# Patient Record
Sex: FEMALE | Race: WHITE | NOT HISPANIC OR LATINO | Employment: OTHER | URBAN - METROPOLITAN AREA
[De-identification: names, ages, dates, MRNs, and addresses within clinical notes are randomized per-mention and may not be internally consistent; named-entity substitution may affect disease eponyms.]

---

## 2023-07-17 DIAGNOSIS — I10 HYPERTENSION, UNSPECIFIED TYPE: Primary | ICD-10-CM

## 2023-07-17 RX ORDER — LISINOPRIL AND HYDROCHLOROTHIAZIDE 20; 25 MG/1; MG/1
1 TABLET ORAL DAILY
COMMUNITY
Start: 2015-11-20 | End: 2023-07-17 | Stop reason: SDUPTHER

## 2023-07-17 NOTE — TELEPHONE ENCOUNTER
Rx Refill Request Telephone Encounter    Name:  Anusha Dinero  :  [id number]  Medication Name:  lisinopril-hydrochlorothiazide 20-25 mg tablet  Specific Pharmacy location:    [facility] - [address]  [address] [facility]  [address]  [facility] [address]  Phone: [phone] Fax: [phone]  Date of last appointment:  2022  Date of next appointment:  2023  Best number to reach patient:  [phone]

## 2023-07-18 ENCOUNTER — OFFICE VISIT (OUTPATIENT)
Dept: PRIMARY CARE | Facility: CLINIC | Age: 68
End: 2023-07-18
Payer: MEDICARE

## 2023-07-18 VITALS
RESPIRATION RATE: 17 BRPM | BODY MASS INDEX: 25.93 KG/M2 | TEMPERATURE: 98.2 F | SYSTOLIC BLOOD PRESSURE: 136 MMHG | HEART RATE: 78 BPM | DIASTOLIC BLOOD PRESSURE: 88 MMHG | OXYGEN SATURATION: 98 % | WEIGHT: 146.4 LBS

## 2023-07-18 DIAGNOSIS — M79.671 RIGHT FOOT PAIN: Primary | ICD-10-CM

## 2023-07-18 DIAGNOSIS — E79.0 HYPERURICEMIA: ICD-10-CM

## 2023-07-18 DIAGNOSIS — N18.32 CHRONIC RENAL IMPAIRMENT, STAGE 3B (MULTI): ICD-10-CM

## 2023-07-18 PROCEDURE — 99214 OFFICE O/P EST MOD 30 MIN: CPT | Performed by: FAMILY MEDICINE

## 2023-07-18 PROCEDURE — 1125F AMNT PAIN NOTED PAIN PRSNT: CPT | Performed by: FAMILY MEDICINE

## 2023-07-18 PROCEDURE — 1036F TOBACCO NON-USER: CPT | Performed by: FAMILY MEDICINE

## 2023-07-18 PROCEDURE — 1160F RVW MEDS BY RX/DR IN RCRD: CPT | Performed by: FAMILY MEDICINE

## 2023-07-18 PROCEDURE — 1159F MED LIST DOCD IN RCRD: CPT | Performed by: FAMILY MEDICINE

## 2023-07-18 RX ORDER — PNV NO.95/FERROUS FUM/FOLIC AC 28MG-0.8MG
1 TABLET ORAL DAILY
COMMUNITY

## 2023-07-18 RX ORDER — PREDNISONE 20 MG/1
40 TABLET ORAL DAILY
Qty: 10 TABLET | Refills: 0 | Status: SHIPPED | OUTPATIENT
Start: 2023-07-18 | End: 2023-07-23

## 2023-07-18 RX ORDER — CHOLECALCIFEROL (VITAMIN D3) 25 MCG
1 TABLET,CHEWABLE ORAL DAILY
COMMUNITY
Start: 2015-10-22

## 2023-07-18 ASSESSMENT — PAIN SCALES - GENERAL: PAINLEVEL: 5

## 2023-07-18 NOTE — PROGRESS NOTES
"Subjective   Patient ID: Anusha Dinero is a 68 y.o. female who presents for ER Follow-up ([facility] 7/15. R foot big toe. Was told gouty arthritis. Given cephalexin 500 mg 4x/day for 5 days. Refused steroids when offered in the ER. Reports swelling and pain started up yesterday again. Edema and redness mainly on joint of R big toe. Is using tylenol to help with pain, reports minimal relief. Was told not to use advil in ER. Reports no weeping/drainage. Warm to touch. ).    HPI : Follow up from ED  On day # 3 of cephalexin for possible cellulitis.  ED's differential diagnosis was gout, patient did not want to take prednisone  \"worried side effects from taking prednisone\"    red forefoot edema, swelling and redness   Associated with  dull aching pain at ball of right great toe with weight bearing    Review of Systems   All other systems reviewed and are negative.      Objective   /88 (BP Location: Left arm, Patient Position: Sitting, BP Cuff Size: Adult)   Pulse 78   Temp 36.8 °C (98.2 °F) (Temporal)   Resp 17   Wt 66.4 kg (146 lb 6.4 oz)   SpO2 98%   BMI 25.93 kg/m²     Physical Exam  Vitals and nursing note reviewed.   Constitutional:       Appearance: Normal appearance.   Cardiovascular:      Rate and Rhythm: Normal rate and regular rhythm.   Pulmonary:      Effort: Pulmonary effort is normal.      Breath sounds: Normal breath sounds.   Feet:      Comments: swelling localized to right fore foot ( toes and forefoot)  With redness, no increased warmth, skin intact.    Neurological:      Mental Status: She is alert.         Assessment/Plan   Problem List Items Addressed This Visit    None  Visit Diagnoses       Right foot pain    -  Primary    differential diagnosis: localized angioedema, gout and cellulitis in decreasing order. complete ceohalexin course.    Relevant Medications    predniSONE (Deltasone) 20 mg tablet    Chronic renal impairment, stage 3b        Recommended urine albumin and renal " ultrasound:part of w/u    Hyperuricemia        start allupurinol at follow up appointment

## 2023-07-19 ENCOUNTER — APPOINTMENT (OUTPATIENT)
Dept: PRIMARY CARE | Facility: CLINIC | Age: 68
End: 2023-07-19
Payer: MEDICARE

## 2023-07-19 RX ORDER — LISINOPRIL AND HYDROCHLOROTHIAZIDE 20; 25 MG/1; MG/1
1 TABLET ORAL DAILY
Qty: 90 TABLET | Refills: 0 | Status: SHIPPED | OUTPATIENT
Start: 2023-07-19 | End: 2023-11-06 | Stop reason: SDUPTHER

## 2023-07-31 ENCOUNTER — OFFICE VISIT (OUTPATIENT)
Dept: PRIMARY CARE | Facility: CLINIC | Age: 68
End: 2023-07-31
Payer: MEDICARE

## 2023-07-31 VITALS
HEART RATE: 78 BPM | BODY MASS INDEX: 26.23 KG/M2 | TEMPERATURE: 98 F | WEIGHT: 148.1 LBS | OXYGEN SATURATION: 98 % | DIASTOLIC BLOOD PRESSURE: 66 MMHG | SYSTOLIC BLOOD PRESSURE: 162 MMHG | RESPIRATION RATE: 17 BRPM

## 2023-07-31 DIAGNOSIS — M10.9 GOUT INVOLVING TOE OF RIGHT FOOT, UNSPECIFIED CAUSE, UNSPECIFIED CHRONICITY: Primary | ICD-10-CM

## 2023-07-31 PROCEDURE — 99214 OFFICE O/P EST MOD 30 MIN: CPT | Performed by: NURSE PRACTITIONER

## 2023-07-31 PROCEDURE — 1159F MED LIST DOCD IN RCRD: CPT | Performed by: NURSE PRACTITIONER

## 2023-07-31 PROCEDURE — 1160F RVW MEDS BY RX/DR IN RCRD: CPT | Performed by: NURSE PRACTITIONER

## 2023-07-31 PROCEDURE — 1125F AMNT PAIN NOTED PAIN PRSNT: CPT | Performed by: NURSE PRACTITIONER

## 2023-07-31 PROCEDURE — 1036F TOBACCO NON-USER: CPT | Performed by: NURSE PRACTITIONER

## 2023-07-31 ASSESSMENT — PAIN SCALES - GENERAL: PAINLEVEL: 3

## 2023-07-31 ASSESSMENT — ENCOUNTER SYMPTOMS
ROS SKIN COMMENTS: SEE HPI
FEVER: 0

## 2023-07-31 NOTE — PROGRESS NOTES
Subjective   Patient ID: Anusha Dinero is a 68 y.o. female who presents for Follow-up (Was seen previously by  on 7/18 for ER f/up. Reports having an exacerbation about 4 days ago. Cannot wear regular shoes. Bilateral legs/feet swelling x2 weeks. Reports no drainage. Warm to touch. ).    HPI     Presents to clinic for follow-up visit.  Patient diagnosed with gout and admits that symptoms are improving.  She states she completed her dose of prednisone . She states she has still been unable to wear enclosed shoes.  Blood pressure elevated today in clinic-  Patient has been taking Advil as needed for the pain.        Appetite normal bowel and bladder normal.        Review of Systems   Constitutional:  Negative for fever.   Skin:         See HPI   All other systems reviewed and are negative.      Objective   /66 (BP Location: Left arm, Patient Position: Sitting, BP Cuff Size: Adult)   Pulse 78   Temp 36.7 °C (98 °F) (Temporal)   Resp 17   Wt 67.2 kg (148 lb 1.6 oz)   SpO2 98%   BMI 26.23 kg/m²     Physical Exam  Vitals reviewed.   Constitutional:       Appearance: Normal appearance. She is not ill-appearing or toxic-appearing.   Cardiovascular:      Rate and Rhythm: Normal rate and regular rhythm.   Pulmonary:      Effort: Pulmonary effort is normal.      Breath sounds: Normal breath sounds.   Feet:      Comments: Right foot without signs of gout.  No erythema no warmth not painful to touch.  Neurovascular intact.  Skin:     General: Skin is warm and dry.   Neurological:      Mental Status: She is alert.         Assessment/Plan   Problem List Items Addressed This Visit    None  Visit Diagnoses       Gout involving toe of right foot, unspecified cause, unspecified chronicity    -  Primary         Discussed acute and chronic medication use for gout.  Patient chooses not to go on medication at this time.  Patient advised to be very careful with taking NSAIDs as this medication has been elevating blood  pressure.    Patient advised to check blood pressures at home and report elevated readings to office.

## 2023-07-31 NOTE — PATIENT INSTRUCTIONS
Advil is likely increasing your blood pressure.  Follow a low purine diet.  Drink plenty of water.  Follow-up 4 months sooner if needed.

## 2023-08-09 ENCOUNTER — APPOINTMENT (OUTPATIENT)
Dept: PRIMARY CARE | Facility: CLINIC | Age: 68
End: 2023-08-09
Payer: MEDICARE

## 2023-11-06 ENCOUNTER — OFFICE VISIT (OUTPATIENT)
Dept: PRIMARY CARE | Facility: CLINIC | Age: 68
End: 2023-11-06
Payer: MEDICARE

## 2023-11-06 VITALS
BODY MASS INDEX: 25.11 KG/M2 | RESPIRATION RATE: 18 BRPM | WEIGHT: 141.7 LBS | HEIGHT: 63 IN | SYSTOLIC BLOOD PRESSURE: 136 MMHG | HEART RATE: 73 BPM | OXYGEN SATURATION: 100 % | TEMPERATURE: 97.2 F | DIASTOLIC BLOOD PRESSURE: 68 MMHG

## 2023-11-06 DIAGNOSIS — Z00.00 MEDICARE ANNUAL WELLNESS VISIT, SUBSEQUENT: Primary | ICD-10-CM

## 2023-11-06 DIAGNOSIS — I10 HYPERTENSION, UNSPECIFIED TYPE: ICD-10-CM

## 2023-11-06 DIAGNOSIS — Z12.31 ENCOUNTER FOR SCREENING MAMMOGRAM FOR MALIGNANT NEOPLASM OF BREAST: ICD-10-CM

## 2023-11-06 PROCEDURE — 1036F TOBACCO NON-USER: CPT | Performed by: NURSE PRACTITIONER

## 2023-11-06 PROCEDURE — 1159F MED LIST DOCD IN RCRD: CPT | Performed by: NURSE PRACTITIONER

## 2023-11-06 PROCEDURE — 1170F FXNL STATUS ASSESSED: CPT | Performed by: NURSE PRACTITIONER

## 2023-11-06 PROCEDURE — 1126F AMNT PAIN NOTED NONE PRSNT: CPT | Performed by: NURSE PRACTITIONER

## 2023-11-06 PROCEDURE — G0008 ADMIN INFLUENZA VIRUS VAC: HCPCS | Performed by: NURSE PRACTITIONER

## 2023-11-06 PROCEDURE — G0439 PPPS, SUBSEQ VISIT: HCPCS | Performed by: NURSE PRACTITIONER

## 2023-11-06 PROCEDURE — 3075F SYST BP GE 130 - 139MM HG: CPT | Performed by: NURSE PRACTITIONER

## 2023-11-06 PROCEDURE — 1160F RVW MEDS BY RX/DR IN RCRD: CPT | Performed by: NURSE PRACTITIONER

## 2023-11-06 PROCEDURE — 90662 IIV NO PRSV INCREASED AG IM: CPT | Performed by: NURSE PRACTITIONER

## 2023-11-06 PROCEDURE — 3078F DIAST BP <80 MM HG: CPT | Performed by: NURSE PRACTITIONER

## 2023-11-06 RX ORDER — LISINOPRIL AND HYDROCHLOROTHIAZIDE 20; 25 MG/1; MG/1
1 TABLET ORAL DAILY
Qty: 90 TABLET | Refills: 2 | Status: SHIPPED | OUTPATIENT
Start: 2023-11-06 | End: 2024-08-02

## 2023-11-06 RX ORDER — BIOTIN 5 MG
1 TABLET ORAL DAILY
COMMUNITY

## 2023-11-06 ASSESSMENT — ACTIVITIES OF DAILY LIVING (ADL)
DRESSING: INDEPENDENT
GROCERY_SHOPPING: INDEPENDENT
TAKING_MEDICATION: INDEPENDENT
DOING_HOUSEWORK: INDEPENDENT
MANAGING_FINANCES: INDEPENDENT
BATHING: INDEPENDENT

## 2023-11-06 ASSESSMENT — PATIENT HEALTH QUESTIONNAIRE - PHQ9
2. FEELING DOWN, DEPRESSED OR HOPELESS: NOT AT ALL
SUM OF ALL RESPONSES TO PHQ9 QUESTIONS 1 AND 2: 0
1. LITTLE INTEREST OR PLEASURE IN DOING THINGS: NOT AT ALL

## 2023-11-06 ASSESSMENT — PAIN SCALES - GENERAL: PAINLEVEL: 0-NO PAIN

## 2023-11-06 NOTE — PATIENT INSTRUCTIONS
Continue medications as prescribed.  Healthy diet and drink plenty of water.  Please have labs drawn-You will be notified with abnormal results only.    Please schedule all necessary health screenings ophthalmology and dentist.    Follow-up in 6 months sooner if needed.  Call office any new concerns.

## 2023-11-06 NOTE — PROGRESS NOTES
"Subjective   Patient ID: Anusha Dinero is a 68 y.o. female who presents for Medicare wellness.    HPI     Patient presents to clinic accompanied by  for Medicare wellness visit.    Patient states she is doing okay no specific complaints or concerns.    Diet and exercises  Eats a healthy diet-we discussed eating several servings of fruits and vegetables daily whole foods increasing fiber and limiting sugar and high sodium in the diet.      Patient is active she cares for her mother exercise by walking.  Sleeps well at night around 7-1/2 hours per night.    Preventive screenings:  Mammogram ordered today in clinic  Colonoscopy 2018 Cologuard 2022 -negative per patient   Ophthalmology and dental visits up-to-date  Bone density-up-to-date per patient  Shingles vaccine-discussed CDC guidelines declines today in clinic  Influenza vaccine-updated today in clinic  Covid vaccine-discussed CDC guidelines will receive booster at local pharmacy or drugstore.  Pneumonia vaccine-discussed CDC guidelines declines today.  Tdap-not up-to-date declines today    Review of Systems    Objective   /68 (BP Location: Left arm, Patient Position: Sitting, BP Cuff Size: Adult)   Pulse 73   Temp 36.2 °C (97.2 °F) (Temporal)   Resp 18   Ht 1.6 m (5' 3\")   Wt 64.3 kg (141 lb 11.2 oz)   SpO2 100%   BMI 25.10 kg/m²     Physical Exam    Assessment/Plan   Problem List Items Addressed This Visit    None  Visit Diagnoses       Medicare annual wellness visit, subsequent    -  Primary    Relevant Orders    BI mammo bilateral screening tomosynthesis    CBC    Comprehensive Metabolic Panel    Lipid Panel    Vitamin D 25-Hydroxy,Total (for eval of Vitamin D levels)    Tsh With Reflex To Free T4 If Abnormal    Flu vaccine, quadrivalent, high-dose, preservative free, age 65y+ (FLUZONE) (Completed)    Referral to Obstetrics / Gynecology    Hypertension, unspecified type      Blood pressure controlled 136/68.    Relevant Medications    " lisinopriL-hydrochlorothiazide 20-25 mg tablet    Other Relevant Orders    CBC    Encounter for screening mammogram for malignant neoplasm of breast        Relevant Orders    BI mammo bilateral screening tomosynthesis

## 2023-11-16 ENCOUNTER — LAB (OUTPATIENT)
Dept: LAB | Facility: LAB | Age: 68
End: 2023-11-16
Payer: MEDICARE

## 2023-11-16 DIAGNOSIS — I10 HYPERTENSION, UNSPECIFIED TYPE: ICD-10-CM

## 2023-11-16 DIAGNOSIS — D64.9 ANEMIA, UNSPECIFIED TYPE: ICD-10-CM

## 2023-11-16 DIAGNOSIS — Z00.00 MEDICARE ANNUAL WELLNESS VISIT, SUBSEQUENT: ICD-10-CM

## 2023-11-16 LAB
25(OH)D3 SERPL-MCNC: 52 NG/ML (ref 30–100)
ALBUMIN SERPL BCP-MCNC: 4.2 G/DL (ref 3.4–5)
ALP SERPL-CCNC: 79 U/L (ref 33–136)
ALT SERPL W P-5'-P-CCNC: 30 U/L (ref 7–45)
ANION GAP SERPL CALC-SCNC: 11 MMOL/L (ref 10–20)
AST SERPL W P-5'-P-CCNC: 27 U/L (ref 9–39)
BILIRUB SERPL-MCNC: 0.7 MG/DL (ref 0–1.2)
BUN SERPL-MCNC: 29 MG/DL (ref 6–23)
CALCIUM SERPL-MCNC: 9.3 MG/DL (ref 8.6–10.3)
CHLORIDE SERPL-SCNC: 104 MMOL/L (ref 98–107)
CHOLEST SERPL-MCNC: 186 MG/DL (ref 0–199)
CHOLESTEROL/HDL RATIO: 2.7
CO2 SERPL-SCNC: 29 MMOL/L (ref 21–32)
CREAT SERPL-MCNC: 1.27 MG/DL (ref 0.5–1.05)
ERYTHROCYTE [DISTWIDTH] IN BLOOD BY AUTOMATED COUNT: 12.7 % (ref 11.5–14.5)
GFR SERPL CREATININE-BSD FRML MDRD: 46 ML/MIN/1.73M*2
GLUCOSE SERPL-MCNC: 86 MG/DL (ref 74–99)
HCT VFR BLD AUTO: 32.7 % (ref 36–46)
HDLC SERPL-MCNC: 69.4 MG/DL
HGB BLD-MCNC: 10.7 G/DL (ref 12–16)
LDLC SERPL CALC-MCNC: 91 MG/DL
MCH RBC QN AUTO: 29.6 PG (ref 26–34)
MCHC RBC AUTO-ENTMCNC: 32.7 G/DL (ref 32–36)
MCV RBC AUTO: 90 FL (ref 80–100)
NON HDL CHOLESTEROL: 117 MG/DL (ref 0–149)
NRBC BLD-RTO: 0 /100 WBCS (ref 0–0)
PLATELET # BLD AUTO: 254 X10*3/UL (ref 150–450)
POTASSIUM SERPL-SCNC: 4 MMOL/L (ref 3.5–5.3)
PROT SERPL-MCNC: 6.8 G/DL (ref 6.4–8.2)
RBC # BLD AUTO: 3.62 X10*6/UL (ref 4–5.2)
SODIUM SERPL-SCNC: 140 MMOL/L (ref 136–145)
TRIGL SERPL-MCNC: 129 MG/DL (ref 0–149)
TSH SERPL-ACNC: 2.31 MIU/L (ref 0.44–3.98)
VLDL: 26 MG/DL (ref 0–40)
WBC # BLD AUTO: 6.8 X10*3/UL (ref 4.4–11.3)

## 2023-11-16 PROCEDURE — 80053 COMPREHEN METABOLIC PANEL: CPT

## 2023-11-16 PROCEDURE — 82728 ASSAY OF FERRITIN: CPT

## 2023-11-16 PROCEDURE — 36415 COLL VENOUS BLD VENIPUNCTURE: CPT

## 2023-11-16 PROCEDURE — 84443 ASSAY THYROID STIM HORMONE: CPT

## 2023-11-16 PROCEDURE — 80061 LIPID PANEL: CPT

## 2023-11-16 PROCEDURE — 83550 IRON BINDING TEST: CPT

## 2023-11-16 PROCEDURE — 82306 VITAMIN D 25 HYDROXY: CPT

## 2023-11-16 PROCEDURE — 85027 COMPLETE CBC AUTOMATED: CPT

## 2023-11-16 PROCEDURE — 83540 ASSAY OF IRON: CPT

## 2023-11-17 DIAGNOSIS — D64.9 ANEMIA, UNSPECIFIED TYPE: Primary | ICD-10-CM

## 2023-11-17 LAB
FERRITIN SERPL-MCNC: 308 NG/ML (ref 8–150)
IRON SATN MFR SERPL: 26 % (ref 25–45)
IRON SERPL-MCNC: 76 UG/DL (ref 35–150)
TIBC SERPL-MCNC: 293 UG/DL (ref 240–445)
UIBC SERPL-MCNC: 217 UG/DL (ref 110–370)

## 2023-12-01 ENCOUNTER — APPOINTMENT (OUTPATIENT)
Dept: RADIOLOGY | Facility: CLINIC | Age: 68
End: 2023-12-01
Payer: MEDICARE

## 2023-12-14 ENCOUNTER — ANCILLARY PROCEDURE (OUTPATIENT)
Dept: RADIOLOGY | Facility: CLINIC | Age: 68
End: 2023-12-14
Payer: MEDICARE

## 2023-12-14 DIAGNOSIS — N64.89 BREAST ASYMMETRY: Primary | ICD-10-CM

## 2023-12-14 DIAGNOSIS — Z00.00 MEDICARE ANNUAL WELLNESS VISIT, SUBSEQUENT: ICD-10-CM

## 2023-12-14 DIAGNOSIS — Z12.31 ENCOUNTER FOR SCREENING MAMMOGRAM FOR MALIGNANT NEOPLASM OF BREAST: ICD-10-CM

## 2023-12-14 PROCEDURE — 77067 SCR MAMMO BI INCL CAD: CPT | Performed by: RADIOLOGY

## 2023-12-14 PROCEDURE — 77063 BREAST TOMOSYNTHESIS BI: CPT | Performed by: RADIOLOGY

## 2023-12-14 PROCEDURE — 77067 SCR MAMMO BI INCL CAD: CPT

## 2023-12-14 NOTE — PROGRESS NOTES
Phoned patient with results of mammogram shows left breast asymmetry recommends further assessment with ultrasound.  Left breast ultrasound ordered.

## 2023-12-15 ENCOUNTER — TELEPHONE (OUTPATIENT)
Dept: PRIMARY CARE | Facility: CLINIC | Age: 68
End: 2023-12-15
Payer: MEDICARE

## 2023-12-15 NOTE — TELEPHONE ENCOUNTER
----- Message from ANTONIO Gayle sent at 12/15/2023 12:53 PM EST -----  Regarding: Marylu  Phoned patient left message to call our office-have not been able to reach patient.  Mammogram shows asymmetry and ultrasound is requested of the left breast.  Ultrasound has already been ordered.  Please call patient and leave another message.  ----- Message -----  From: Interface, Radiology Results In  Sent: 12/14/2023  12:18 PM EST  To: ANTONIO Gayle

## 2023-12-15 NOTE — TELEPHONE ENCOUNTER
Patient returned nurses phone call and notified patient of mammogram results and CNP recommendations. Patient verbalized understanding.

## 2023-12-15 NOTE — TELEPHONE ENCOUNTER
Called patient, unable to reach, [facility] to relay results. Will also send MyChart message.

## 2024-01-04 ENCOUNTER — ANCILLARY PROCEDURE (OUTPATIENT)
Dept: RADIOLOGY | Facility: CLINIC | Age: 69
End: 2024-01-04
Payer: MEDICARE

## 2024-01-04 DIAGNOSIS — N64.89 BREAST ASYMMETRY: ICD-10-CM

## 2024-01-04 DIAGNOSIS — R92.8 OTHER ABNORMAL AND INCONCLUSIVE FINDINGS ON DIAGNOSTIC IMAGING OF BREAST: ICD-10-CM

## 2024-01-04 PROCEDURE — 77061 BREAST TOMOSYNTHESIS UNI: CPT | Mod: LT

## 2024-01-04 PROCEDURE — 76642 ULTRASOUND BREAST LIMITED: CPT | Mod: LT

## 2024-01-04 PROCEDURE — G0279 TOMOSYNTHESIS, MAMMO: HCPCS | Mod: LEFT SIDE | Performed by: RADIOLOGY

## 2024-01-04 PROCEDURE — 76642 ULTRASOUND BREAST LIMITED: CPT | Mod: LEFT SIDE | Performed by: RADIOLOGY

## 2024-01-04 PROCEDURE — 77065 DX MAMMO INCL CAD UNI: CPT | Mod: LEFT SIDE | Performed by: RADIOLOGY

## 2024-01-09 ENCOUNTER — TELEPHONE (OUTPATIENT)
Dept: PRIMARY CARE | Facility: CLINIC | Age: 69
End: 2024-01-09
Payer: MEDICARE

## 2024-01-09 NOTE — TELEPHONE ENCOUNTER
Called and notified patient of mammogram results and CNP recommendations. Patient verbalized understanding.

## 2024-01-09 NOTE — TELEPHONE ENCOUNTER
----- Message from ANTONIO Gayle sent at 1/8/2024  4:04 PM EST -----  Regarding: US  Per radiology- left breast ultrasound probably benign recommend repeat follow-up in 6 months.  ----- Message -----  From: Interface, Radiology Results In  Sent: 1/4/2024   9:23 AM EST  To: ANTONIO Gayle

## 2024-05-06 ENCOUNTER — OFFICE VISIT (OUTPATIENT)
Dept: PRIMARY CARE | Facility: CLINIC | Age: 69
End: 2024-05-06
Payer: MEDICARE

## 2024-05-06 VITALS
TEMPERATURE: 95 F | SYSTOLIC BLOOD PRESSURE: 171 MMHG | BODY MASS INDEX: 24.41 KG/M2 | DIASTOLIC BLOOD PRESSURE: 69 MMHG | HEART RATE: 72 BPM | OXYGEN SATURATION: 98 % | RESPIRATION RATE: 18 BRPM | WEIGHT: 137.8 LBS

## 2024-05-06 DIAGNOSIS — Z71.84 TRAVEL ADVICE ENCOUNTER: ICD-10-CM

## 2024-05-06 DIAGNOSIS — I10 PRIMARY HYPERTENSION: ICD-10-CM

## 2024-05-06 DIAGNOSIS — M10.071 ACUTE IDIOPATHIC GOUT OF RIGHT FOOT: Primary | ICD-10-CM

## 2024-05-06 DIAGNOSIS — I77.1 SUBCLAVIAN ARTERIAL STENOSIS (CMS-HCC): ICD-10-CM

## 2024-05-06 DIAGNOSIS — N18.32 CHRONIC RENAL IMPAIRMENT, STAGE 3B (MULTI): ICD-10-CM

## 2024-05-06 PROCEDURE — 3078F DIAST BP <80 MM HG: CPT | Performed by: FAMILY MEDICINE

## 2024-05-06 PROCEDURE — 99214 OFFICE O/P EST MOD 30 MIN: CPT | Performed by: FAMILY MEDICINE

## 2024-05-06 PROCEDURE — 1159F MED LIST DOCD IN RCRD: CPT | Performed by: FAMILY MEDICINE

## 2024-05-06 PROCEDURE — 1036F TOBACCO NON-USER: CPT | Performed by: FAMILY MEDICINE

## 2024-05-06 PROCEDURE — 3077F SYST BP >= 140 MM HG: CPT | Performed by: FAMILY MEDICINE

## 2024-05-06 PROCEDURE — 1160F RVW MEDS BY RX/DR IN RCRD: CPT | Performed by: FAMILY MEDICINE

## 2024-05-06 PROCEDURE — 1126F AMNT PAIN NOTED NONE PRSNT: CPT | Performed by: FAMILY MEDICINE

## 2024-05-06 RX ORDER — COLCHICINE 0.6 MG/1
TABLET ORAL
Qty: 30 TABLET | Refills: 5 | Status: SHIPPED | OUTPATIENT
Start: 2024-05-06

## 2024-05-06 ASSESSMENT — PATIENT HEALTH QUESTIONNAIRE - PHQ9
1. LITTLE INTEREST OR PLEASURE IN DOING THINGS: NOT AT ALL
2. FEELING DOWN, DEPRESSED OR HOPELESS: NOT AT ALL
SUM OF ALL RESPONSES TO PHQ9 QUESTIONS 1 AND 2: 0

## 2024-05-06 ASSESSMENT — PAIN SCALES - GENERAL: PAINLEVEL: 0-NO PAIN

## 2024-05-06 NOTE — PROGRESS NOTES
Subjective   Patient ID: Anusha Dinero is a 69 y.o. female who presents for Follow-up (6 Month).    HPI : Going to [place] for 3 weeks trip with family.    Would like to take medication for gout flare up, if needed.          Review of Systems   All other systems reviewed and are negative.      Objective   /69 (BP Location: Left arm, Patient Position: Sitting, BP Cuff Size: Adult)   Pulse 72   Temp 35 °C (95 °F) (Temporal)   Resp 18   Wt 62.5 kg (137 lb 12.8 oz)   SpO2 98%   BMI 24.41 kg/m²     Physical Exam  Vitals and nursing note reviewed.   Cardiovascular:      Rate and Rhythm: Normal rate and regular rhythm.   Pulmonary:      Effort: Pulmonary effort is normal.      Breath sounds: Normal breath sounds.   Neurological:      Mental Status: She is alert.   Psychiatric:         Mood and Affect: Mood normal.         Behavior: Behavior normal.         Thought Content: Thought content normal.         Judgment: Judgment normal.         Assessment/Plan   Diagnoses and all orders for this visit:  Acute idiopathic gout of right foot  -     colchicine 0.6 mg tablet; Day 1:Take 1.2 mg at the onset, 0.6 mg an hour later. Day 2, take 0.6 mg bid until flare up resolves  Primary hypertension  -     CT cardiac scoring wo IV contrast; Future  Chronic renal impairment, stage 3b (Multi)  -     CT cardiac scoring wo IV contrast; Future  Subclavian arterial stenosis (CMS-HCC)  Comments:  diagnosed in her 20s, is stable.  Travel advice encounter  Other orders  -     Follow Up In Primary Care - Established; Future

## 2024-07-02 ENCOUNTER — HOSPITAL ENCOUNTER (OUTPATIENT)
Dept: RADIOLOGY | Facility: HOSPITAL | Age: 69
Discharge: HOME | End: 2024-07-02
Payer: MEDICARE

## 2024-07-02 DIAGNOSIS — I10 PRIMARY HYPERTENSION: ICD-10-CM

## 2024-07-02 DIAGNOSIS — N18.32 CHRONIC RENAL IMPAIRMENT, STAGE 3B (MULTI): ICD-10-CM

## 2024-07-02 PROCEDURE — 75571 CT HRT W/O DYE W/CA TEST: CPT

## 2024-07-11 DIAGNOSIS — I10 HYPERTENSION, UNSPECIFIED TYPE: ICD-10-CM

## 2024-07-17 RX ORDER — LISINOPRIL AND HYDROCHLOROTHIAZIDE 20; 25 MG/1; MG/1
1 TABLET ORAL DAILY
Qty: 90 TABLET | Refills: 2 | Status: SHIPPED | OUTPATIENT
Start: 2024-07-17 | End: 2025-07-17

## 2024-11-06 ENCOUNTER — APPOINTMENT (OUTPATIENT)
Dept: PRIMARY CARE | Facility: CLINIC | Age: 69
End: 2024-11-06
Payer: MEDICARE

## 2024-11-06 VITALS
BODY MASS INDEX: 23.75 KG/M2 | RESPIRATION RATE: 18 BRPM | OXYGEN SATURATION: 98 % | DIASTOLIC BLOOD PRESSURE: 69 MMHG | TEMPERATURE: 97.3 F | HEART RATE: 77 BPM | SYSTOLIC BLOOD PRESSURE: 130 MMHG | WEIGHT: 134.1 LBS

## 2024-11-06 DIAGNOSIS — I10 HYPERTENSION, BENIGN: Primary | ICD-10-CM

## 2024-11-06 DIAGNOSIS — R09.89 BILATERAL CAROTID BRUITS: ICD-10-CM

## 2024-11-06 DIAGNOSIS — N18.32 CHRONIC RENAL IMPAIRMENT, STAGE 3B (MULTI): ICD-10-CM

## 2024-11-06 DIAGNOSIS — Z13.820 ENCOUNTER FOR OSTEOPOROSIS SCREENING IN ASYMPTOMATIC POSTMENOPAUSAL PATIENT: ICD-10-CM

## 2024-11-06 DIAGNOSIS — Z12.31 ENCOUNTER FOR SCREENING MAMMOGRAM FOR BREAST CANCER: ICD-10-CM

## 2024-11-06 DIAGNOSIS — Z00.00 MEDICARE ANNUAL WELLNESS VISIT, SUBSEQUENT: ICD-10-CM

## 2024-11-06 DIAGNOSIS — I77.1 SUBCLAVIAN ARTERIAL STENOSIS: ICD-10-CM

## 2024-11-06 DIAGNOSIS — Z78.0 ENCOUNTER FOR OSTEOPOROSIS SCREENING IN ASYMPTOMATIC POSTMENOPAUSAL PATIENT: ICD-10-CM

## 2024-11-06 DIAGNOSIS — I71.21 ANEURYSM OF ASCENDING AORTA WITHOUT RUPTURE (CMS-HCC): ICD-10-CM

## 2024-11-06 PROCEDURE — 1123F ACP DISCUSS/DSCN MKR DOCD: CPT | Performed by: FAMILY MEDICINE

## 2024-11-06 PROCEDURE — 1158F ADVNC CARE PLAN TLK DOCD: CPT | Performed by: FAMILY MEDICINE

## 2024-11-06 PROCEDURE — 3075F SYST BP GE 130 - 139MM HG: CPT | Performed by: FAMILY MEDICINE

## 2024-11-06 PROCEDURE — 1160F RVW MEDS BY RX/DR IN RCRD: CPT | Performed by: FAMILY MEDICINE

## 2024-11-06 PROCEDURE — 1100F PTFALLS ASSESS-DOCD GE2>/YR: CPT | Performed by: FAMILY MEDICINE

## 2024-11-06 PROCEDURE — 1036F TOBACCO NON-USER: CPT | Performed by: FAMILY MEDICINE

## 2024-11-06 PROCEDURE — 1170F FXNL STATUS ASSESSED: CPT | Performed by: FAMILY MEDICINE

## 2024-11-06 PROCEDURE — G0439 PPPS, SUBSEQ VISIT: HCPCS | Performed by: FAMILY MEDICINE

## 2024-11-06 PROCEDURE — 1159F MED LIST DOCD IN RCRD: CPT | Performed by: FAMILY MEDICINE

## 2024-11-06 PROCEDURE — 1126F AMNT PAIN NOTED NONE PRSNT: CPT | Performed by: FAMILY MEDICINE

## 2024-11-06 PROCEDURE — 3078F DIAST BP <80 MM HG: CPT | Performed by: FAMILY MEDICINE

## 2024-11-06 ASSESSMENT — ACTIVITIES OF DAILY LIVING (ADL)
DOING_HOUSEWORK: INDEPENDENT
MANAGING_FINANCES: INDEPENDENT
TAKING_MEDICATION: INDEPENDENT
DRESSING: INDEPENDENT
GROCERY_SHOPPING: INDEPENDENT
BATHING: INDEPENDENT

## 2024-11-06 ASSESSMENT — PAIN SCALES - GENERAL: PAINLEVEL_OUTOF10: 0-NO PAIN

## 2024-11-06 NOTE — ASSESSMENT & PLAN NOTE
Stable  Maintain normal BP,Do not take Ibuprofen/Naprosyn for pain, take tylenol for pain.  Orders:    CBC and Auto Differential; Future

## 2024-11-06 NOTE — ASSESSMENT & PLAN NOTE
Measures 4.2 cm  Orders:    Referral to Cardiology; Future    Referral to Vascular Surgery; Future

## 2024-11-06 NOTE — ASSESSMENT & PLAN NOTE
Diagnosed in her 20s.   Asymptomatic. Her body developed revascularization ( bypass), needed no intervention

## 2024-11-06 NOTE — ASSESSMENT & PLAN NOTE
? Transmitted from aortic aneusym  Patient referred to cardiology   Orders:    Referral to Cardiology; Future    Referral to Vascular Surgery; Future

## 2024-11-06 NOTE — PROGRESS NOTES
Subjective   Reason for Visit: Anusha Dinero is an 69 y.o. female here for a Medicare Wellness visit.     Past Medical, Surgical, and Family History reviewed and updated in chart.    Reviewed all medications by prescribing practitioner or clinical pharmacist (such as prescriptions, OTCs, herbal therapies and supplements) and documented in the medical record.    HPI    Medicare wellness  Life: Satisfactory  HRA: low risk  Grieving: mom death. Sister  in August from Sudden cardiac arrest.  Maintain good relation with immediate and extended famil  Denies Social isolation  ADL and IADL independent  Denies prolonged depression/anxiety.  Cognition and memory intact.        Patient Care Team:  Juan Alberto SLAUGHTER MD as PCP - General (Family Medicine)  Juan Alberto SLAUGHTER MD as PCP - [employer] PCP     Consultants : optometrist.    PMH and PSH reviewed  Problem list and medications: reconciled..      Review of Systems   All other systems reviewed and are negative.      Objective   Vitals:  /69 (BP Location: Left arm, Patient Position: Sitting, BP Cuff Size: Adult)   Pulse 77   Temp 36.3 °C (97.3 °F) (Temporal)   Resp 18   Wt 60.8 kg (134 lb 1.6 oz)   SpO2 98%   BMI 23.75 kg/m²       Physical Exam  Vitals and nursing note reviewed.   Cardiovascular:      Rate and Rhythm: Normal rate and regular rhythm.   Pulmonary:      Effort: Pulmonary effort is normal.      Breath sounds: Normal breath sounds.   Musculoskeletal:      Cervical back: Neck supple.   Lymphadenopathy:      Cervical: No cervical adenopathy.   Neurological:      Mental Status: She is alert.   Psychiatric:         Attention and Perception: Attention normal.         Mood and Affect: Mood normal.         Speech: Speech normal.         Behavior: Behavior normal.         Thought Content: Thought content normal.         Cognition and Memory: Cognition and memory normal.         Judgment: Judgment normal.         Assessment &  Plan  Hypertension, benign  At goal  Continue with Zestril  Subclavian arterial stenosis  Diagnosed in her 20s.   Asymptomatic. Her body developed revascularization ( bypass), needed no intervention   Chronic renal impairment, stage 3b (Multi)  Stable  Maintain normal BP,Do not take Ibuprofen/Naprosyn for pain, take tylenol for pain.  Orders:    CBC and Auto Differential; Future    Medicare annual wellness visit, subsequent  Brain health and fall prevention strategies discussed.  Orders:    Albumin-Creatinine Ratio, Urine Random; Future    Lipid panel; Future    Comprehensive Metabolic Panel; Future    CBC and Auto Differential; Future    TSH; Future    Encounter for screening mammogram for breast cancer    Orders:    BI mammo bilateral screening tomosynthesis; Future    Aneurysm of ascending aorta without rupture (CMS-HCC)  Measures 4.2 cm  Orders:    Referral to Cardiology; Future    Referral to Vascular Surgery; Future    Encounter for osteoporosis screening in asymptomatic postmenopausal patient  Vitamin D3+K2 and calcium citrate daily forbone health  Orders:    XR DEXA bone density; Future    Bilateral carotid bruits  ? Transmitted from aortic aneusym  Patient referred to cardiology   Orders:    Referral to Cardiology; Future    Referral to Vascular Surgery; Future           Alcohol Misuse Screen  5 - 10 minutes were spent screening the patient for alcohol misuse disorder.    Cardiac Risk Assessment  15 - 20 minutes were spent discussing Cardiovascular risk and, if needed, lifestyle modifications were recommended, including nutritional choices, exercise, and elimination of habits contributing to risk.   Aspirin use/disuse was discussed following the guidelines below:  low dose ASA ( mg) should be considered:    If prior Heart Attack/Stroke/Peripheral vascular disease:  Generally recommend daily low dose aspirin unless extremely high bleeding risk (e.g., gastrointestinal).    If no prior Heart  Attack/Stroke/Peripheral vascular disease:              Age over 70: Do not use Aspirin for prevention    Age less than 70 and 10-year cardiovascular disease risk is >20%: use low dose Aspirin for prevention.                 Depression Screening  5 - 10 minutes were spent screening for depression.   Counseled on normal grieving process .

## 2024-12-23 PROBLEM — D50.9 IRON DEFICIENCY ANEMIA: Status: ACTIVE | Noted: 2024-12-23

## 2024-12-23 PROBLEM — Z98.890 HISTORY OF CARDIAC CATHETERIZATION: Status: ACTIVE | Noted: 2024-12-23

## 2024-12-23 PROBLEM — E78.5 HYPERLIPIDEMIA: Status: ACTIVE | Noted: 2024-12-23

## 2024-12-23 PROBLEM — M81.0 OSTEOPOROSIS: Status: ACTIVE | Noted: 2024-12-23

## 2024-12-23 PROBLEM — E55.9 VITAMIN D DEFICIENCY: Status: ACTIVE | Noted: 2024-12-23

## 2025-01-08 ENCOUNTER — OFFICE VISIT (OUTPATIENT)
Dept: CARDIOLOGY | Facility: CLINIC | Age: 70
End: 2025-01-08
Payer: MEDICARE

## 2025-01-08 VITALS
SYSTOLIC BLOOD PRESSURE: 176 MMHG | BODY MASS INDEX: 24.38 KG/M2 | DIASTOLIC BLOOD PRESSURE: 60 MMHG | OXYGEN SATURATION: 95 % | HEIGHT: 63 IN | HEART RATE: 72 BPM | WEIGHT: 137.6 LBS

## 2025-01-08 DIAGNOSIS — R09.89 BILATERAL CAROTID BRUITS: ICD-10-CM

## 2025-01-08 DIAGNOSIS — R01.1 MURMUR, HEART: ICD-10-CM

## 2025-01-08 DIAGNOSIS — R93.1 AGATSTON CORONARY ARTERY CALCIUM SCORE LESS THAN 100: ICD-10-CM

## 2025-01-08 DIAGNOSIS — I71.21 ANEURYSM OF ASCENDING AORTA WITHOUT RUPTURE (CMS-HCC): Primary | ICD-10-CM

## 2025-01-08 DIAGNOSIS — I10 PRIMARY HYPERTENSION: ICD-10-CM

## 2025-01-08 LAB
ATRIAL RATE: 63 BPM
P AXIS: 86 DEGREES
P OFFSET: 200 MS
P ONSET: 154 MS
PR INTERVAL: 136 MS
Q ONSET: 222 MS
QRS COUNT: 10 BEATS
QRS DURATION: 76 MS
QT INTERVAL: 424 MS
QTC CALCULATION(BAZETT): 433 MS
QTC FREDERICIA: 431 MS
R AXIS: 75 DEGREES
T AXIS: 70 DEGREES
T OFFSET: 434 MS
VENTRICULAR RATE: 63 BPM

## 2025-01-08 PROCEDURE — 3078F DIAST BP <80 MM HG: CPT | Performed by: STUDENT IN AN ORGANIZED HEALTH CARE EDUCATION/TRAINING PROGRAM

## 2025-01-08 PROCEDURE — 99214 OFFICE O/P EST MOD 30 MIN: CPT | Performed by: STUDENT IN AN ORGANIZED HEALTH CARE EDUCATION/TRAINING PROGRAM

## 2025-01-08 PROCEDURE — 93010 ELECTROCARDIOGRAM REPORT: CPT | Performed by: STUDENT IN AN ORGANIZED HEALTH CARE EDUCATION/TRAINING PROGRAM

## 2025-01-08 PROCEDURE — 93005 ELECTROCARDIOGRAM TRACING: CPT | Performed by: STUDENT IN AN ORGANIZED HEALTH CARE EDUCATION/TRAINING PROGRAM

## 2025-01-08 PROCEDURE — 3008F BODY MASS INDEX DOCD: CPT | Performed by: STUDENT IN AN ORGANIZED HEALTH CARE EDUCATION/TRAINING PROGRAM

## 2025-01-08 PROCEDURE — 3077F SYST BP >= 140 MM HG: CPT | Performed by: STUDENT IN AN ORGANIZED HEALTH CARE EDUCATION/TRAINING PROGRAM

## 2025-01-08 PROCEDURE — 1159F MED LIST DOCD IN RCRD: CPT | Performed by: STUDENT IN AN ORGANIZED HEALTH CARE EDUCATION/TRAINING PROGRAM

## 2025-01-08 PROCEDURE — 99204 OFFICE O/P NEW MOD 45 MIN: CPT | Performed by: STUDENT IN AN ORGANIZED HEALTH CARE EDUCATION/TRAINING PROGRAM

## 2025-01-08 NOTE — PROGRESS NOTES
Referred by Dr. Raza for Hypertension, bruit, aneurysm, and Hyperlipidemia (Annual)     History Of Present Illness:    Anusha Dinero is a 69 y.o. female past patient notable for hypertension referred to our clinic due to aortic aneurysm appreciated on recent calcium score.  Patient recently underwent a calcium score which came back with a score of 43.91.  Additionally was found to have SMA thoracic aorta measured 4.2 cm and diffuse thoracic aortic calcified atherosclerosis.  She is asymptomatic at baseline.  She does have family history of sudden cardiac death that happened to her sister at the age of 74.  No history of aortopathy's.  She has chronic longstanding hypertension and states she typically has situational hypertension.  Most recent cholesterol shows an LDL of 91 HDL of 69.      Past Medical History:  She has a past medical history of Anemia, H/O cardiac catheterization, Hyperlipidemia, Hypertension, Living will in place, Osteoporosis, Subclavian arterial stenosis, Vitamin B12 deficiency, and Vitamin D deficiency.    Past Surgical History:  She has a past surgical history that includes Colonoscopy (05/09/2018).      Social History:  She reports that she has never smoked. She has never been exposed to tobacco smoke. She has never used smokeless tobacco. She reports current alcohol use of about 1.0 standard drink of alcohol per week. She reports that she does not use drugs.    Family History:  Family History   Problem Relation Name Age of Onset    No Known Problems Mother      Stroke Father      Other (Quadraplegic) Father      No Known Problems Sister      No Known Problems Brother      No Known Problems Daughter      No Known Problems Son      No Known Problems Mother's Sister      No Known Problems Mother's Brother      No Known Problems Father's Sister      No Known Problems Father's Brother      No Known Problems Maternal Grandmother      No Known Problems Maternal Grandfather      Cancer Paternal  "Grandmother      Pancreatic cancer Paternal Grandfather          Allergies:  Patient has no known allergies.    Outpatient Medications:  Current Outpatient Medications   Medication Instructions    biotin 5 mg tablet 1 tablet, Daily    calcium carbonate-vitamin D3 (Oscal-500) 500 mg-10 mcg (400 unit) tablet 1 tablet, Daily    colchicine 0.6 mg tablet Day 1:Take 1.2 mg at the onset, 0.6 mg an hour later. Day 2, take 0.6 mg bid until flare up resolves    cyanocobalamin, vitamin B-12, 1,000 mcg capsule 1 capsule, Daily    lisinopriL-hydrochlorothiazide 20-25 mg tablet 1 tablet, oral, Daily        Last Recorded Vitals:  Vitals:    01/08/25 0953   BP: 176/60   BP Location: Left arm   Patient Position: Sitting   BP Cuff Size: Adult   Pulse: 72   SpO2: 95%   Weight: 62.4 kg (137 lb 9.6 oz)   Height: 1.6 m (5' 3\")       Physical Exam:  General: No acute distress,  A&O x3  Skin: Warm and dry  Neck: JVD is not elevated, carotid bruits noted  ENT: Moist mucous membranes no lesions appreciated  Pulmonary: CTAB  Cards: Regular rate rhythm, 2 out of 6 crescendo decrescendo murmur leukocytic pattern, no gallops or rubs appreciated normal S1-S2  Abdomen: Soft nontender nondistended  Extremities: No edema or cyanosis  Psych: Appropriate mood and affect      Last Labs:  CBC -  Lab Results   Component Value Date    WBC 6.8 11/16/2023    HGB 10.7 (L) 11/16/2023    HCT 32.7 (L) 11/16/2023    MCV 90 11/16/2023     11/16/2023       CMP -  Lab Results   Component Value Date    CALCIUM 9.3 11/16/2023    PROT 6.8 11/16/2023    ALBUMIN 4.2 11/16/2023    AST 27 11/16/2023    ALT 30 11/16/2023    ALKPHOS 79 11/16/2023    BILITOT 0.7 11/16/2023       LIPID PANEL -   Lab Results   Component Value Date    CHOL 186 11/16/2023    TRIG 129 11/16/2023    HDL 69.4 11/16/2023    CHHDL 2.7 11/16/2023    LDLF 86 06/03/2019    VLDL 26 11/16/2023    NHDL 117 11/16/2023       RENAL FUNCTION PANEL -   Lab Results   Component Value Date    GLUCOSE 86 " "11/16/2023     11/16/2023    K 4.0 11/16/2023     11/16/2023    CO2 29 11/16/2023    ANIONGAP 11 11/16/2023    BUN 29 (H) 11/16/2023    CREATININE 1.27 (H) 11/16/2023    CALCIUM 9.3 11/16/2023    ALBUMIN 4.2 11/16/2023        No results found for: \"BNP\", \"HGBA1C\"    Assessment/Plan     1.  Aortic aneurysm: Measuring 4.2 cm by calcium score.  No previous comparisons in our system.  Presence of cardiac murmur noted on physical exam.  We have ordered echocardiogram to assess the aortic valve.  Will repeat an MRA of the chest in 6 months for surveillance purposes.    2.  Calcium score of 43.  Aortic atherosclerosis.  Diffuse.  Baseline ECG of normal sinus rhythm.  Discussed the role of statins in terms of minimizing risk of heart attack and strokes.  Patient at the present is reluctant to initiate new therapy.  Will reassess and discuss further at next appointment.    3.  Carotid bruits: Bilateral: Ordered carotid ultrasound for further characterization.    4.  Hypertension: Blood pressures are above goal.  Patient will monitor blood pressures daily and bring that data to us at next appointment.  Will adjust antihypertensive therapy as appropriate.    Follow-up in 6 months    (This note was generated with voice recognition software and may contain errors including spelling, grammar, syntax and missed recognition of what was dictated, of which may not have been fully corrected)     Ignacio Berg MD PhD  "

## 2025-01-08 NOTE — PATIENT INSTRUCTIONS
MRA of the chest in 6 months  Echocardiogram  Carotid ultrasound  Follow up in 6 months  Monitor blood pressure daily

## 2025-01-15 ENCOUNTER — HOSPITAL ENCOUNTER (OUTPATIENT)
Dept: RADIOLOGY | Facility: CLINIC | Age: 70
Discharge: HOME | End: 2025-01-15
Payer: MEDICARE

## 2025-01-15 VITALS — HEIGHT: 63 IN | WEIGHT: 137 LBS | BODY MASS INDEX: 24.27 KG/M2

## 2025-01-15 DIAGNOSIS — Z78.0 ENCOUNTER FOR OSTEOPOROSIS SCREENING IN ASYMPTOMATIC POSTMENOPAUSAL PATIENT: ICD-10-CM

## 2025-01-15 DIAGNOSIS — Z12.31 ENCOUNTER FOR SCREENING MAMMOGRAM FOR BREAST CANCER: ICD-10-CM

## 2025-01-15 DIAGNOSIS — Z13.820 ENCOUNTER FOR OSTEOPOROSIS SCREENING IN ASYMPTOMATIC POSTMENOPAUSAL PATIENT: ICD-10-CM

## 2025-01-15 PROCEDURE — 77067 SCR MAMMO BI INCL CAD: CPT | Performed by: RADIOLOGY

## 2025-01-15 PROCEDURE — 77080 DXA BONE DENSITY AXIAL: CPT | Performed by: RADIOLOGY

## 2025-01-15 PROCEDURE — 77080 DXA BONE DENSITY AXIAL: CPT

## 2025-01-15 PROCEDURE — 77063 BREAST TOMOSYNTHESIS BI: CPT | Performed by: RADIOLOGY

## 2025-01-15 PROCEDURE — 77067 SCR MAMMO BI INCL CAD: CPT

## 2025-01-27 ENCOUNTER — HOSPITAL ENCOUNTER (OUTPATIENT)
Dept: VASCULAR MEDICINE | Facility: CLINIC | Age: 70
Discharge: HOME | End: 2025-01-27
Payer: MEDICARE

## 2025-01-27 ENCOUNTER — HOSPITAL ENCOUNTER (OUTPATIENT)
Dept: CARDIOLOGY | Facility: CLINIC | Age: 70
Discharge: HOME | End: 2025-01-27
Payer: MEDICARE

## 2025-01-27 VITALS — BODY MASS INDEX: 24.27 KG/M2 | HEIGHT: 63 IN

## 2025-01-27 DIAGNOSIS — I10 PRIMARY HYPERTENSION: ICD-10-CM

## 2025-01-27 DIAGNOSIS — I71.21 ANEURYSM OF ASCENDING AORTA WITHOUT RUPTURE (CMS-HCC): ICD-10-CM

## 2025-01-27 DIAGNOSIS — R93.1 AGATSTON CORONARY ARTERY CALCIUM SCORE LESS THAN 100: ICD-10-CM

## 2025-01-27 DIAGNOSIS — R01.1 MURMUR, HEART: ICD-10-CM

## 2025-01-27 DIAGNOSIS — R09.89 BILATERAL CAROTID BRUITS: ICD-10-CM

## 2025-01-27 PROCEDURE — 93306 TTE W/DOPPLER COMPLETE: CPT

## 2025-01-27 PROCEDURE — 93880 EXTRACRANIAL BILAT STUDY: CPT

## 2025-01-27 PROCEDURE — 93880 EXTRACRANIAL BILAT STUDY: CPT | Performed by: SURGERY

## 2025-02-02 ENCOUNTER — OFFICE VISIT (OUTPATIENT)
Dept: URGENT CARE | Age: 70
End: 2025-02-02
Payer: MEDICARE

## 2025-02-02 VITALS
RESPIRATION RATE: 14 BRPM | OXYGEN SATURATION: 98 % | HEART RATE: 90 BPM | SYSTOLIC BLOOD PRESSURE: 153 MMHG | TEMPERATURE: 97.6 F | DIASTOLIC BLOOD PRESSURE: 63 MMHG

## 2025-02-02 DIAGNOSIS — B02.8 HERPES ZOSTER WITH COMPLICATION: Primary | ICD-10-CM

## 2025-02-02 PROCEDURE — 3078F DIAST BP <80 MM HG: CPT | Performed by: STUDENT IN AN ORGANIZED HEALTH CARE EDUCATION/TRAINING PROGRAM

## 2025-02-02 PROCEDURE — 1159F MED LIST DOCD IN RCRD: CPT | Performed by: STUDENT IN AN ORGANIZED HEALTH CARE EDUCATION/TRAINING PROGRAM

## 2025-02-02 PROCEDURE — 1036F TOBACCO NON-USER: CPT | Performed by: STUDENT IN AN ORGANIZED HEALTH CARE EDUCATION/TRAINING PROGRAM

## 2025-02-02 PROCEDURE — 1160F RVW MEDS BY RX/DR IN RCRD: CPT | Performed by: STUDENT IN AN ORGANIZED HEALTH CARE EDUCATION/TRAINING PROGRAM

## 2025-02-02 PROCEDURE — 99202 OFFICE O/P NEW SF 15 MIN: CPT | Performed by: STUDENT IN AN ORGANIZED HEALTH CARE EDUCATION/TRAINING PROGRAM

## 2025-02-02 PROCEDURE — 3077F SYST BP >= 140 MM HG: CPT | Performed by: STUDENT IN AN ORGANIZED HEALTH CARE EDUCATION/TRAINING PROGRAM

## 2025-02-02 RX ORDER — VALACYCLOVIR HYDROCHLORIDE 1 G/1
1000 TABLET, FILM COATED ORAL 3 TIMES DAILY
Qty: 21 TABLET | Refills: 0 | Status: SHIPPED | OUTPATIENT
Start: 2025-02-02 | End: 2025-02-09

## 2025-02-02 ASSESSMENT — ENCOUNTER SYMPTOMS
GASTROINTESTINAL NEGATIVE: 1
CONSTITUTIONAL NEGATIVE: 1
CARDIOVASCULAR NEGATIVE: 1
RESPIRATORY NEGATIVE: 1

## 2025-02-02 NOTE — PROGRESS NOTES
Subjective   Patient ID: Anusha Dinero is a 69 y.o. female. They present today with a chief complaint of Rash (Rash X 1 day. Side pain X 1 week. TD-MA).    History of Present Illness    Rash      Patient presents to the urgent care for a chief complaint of right-sided erythematous blistery rash that is painful that occurred last night, patient states has had prodrome over the last week of burning stinging sharp stabbing pain in the area and noticed a blistering last night prompting visit to the urgent care unable to go into primary care until tomorrow prompting visit    Past Medical History  Allergies as of 02/02/2025    (No Known Allergies)       (Not in a hospital admission)       Past Medical History:   Diagnosis Date    Anemia     H/O cardiac catheterization     Hyperlipidemia     Hypertension     Living will in place      POA    Osteoporosis     Subclavian arterial stenosis     Vitamin B12 deficiency     Vitamin D deficiency        Past Surgical History:   Procedure Laterality Date    COLONOSCOPY  05/09/2018    2 polyps, Dr. JOO Baldwin        reports that she has never smoked. She has never been exposed to tobacco smoke. She has never used smokeless tobacco. She reports current alcohol use of about 1.0 standard drink of alcohol per week. She reports that she does not use drugs.    Review of Systems  Review of Systems   Constitutional: Negative.    HENT: Negative.     Respiratory: Negative.     Cardiovascular: Negative.    Gastrointestinal: Negative.    Skin:  Positive for rash.                                  Objective    Vitals:    02/02/25 1154   BP: 153/63   Pulse: 90   Resp: 14   Temp: 36.4 °C (97.6 °F)   SpO2: 98%     No LMP recorded. Patient is postmenopausal.    Physical Exam  Vitals and nursing note reviewed.   Constitutional:       General: She is not in acute distress.     Appearance: Normal appearance. She is not ill-appearing, toxic-appearing or diaphoretic.   Cardiovascular:      Rate and  Rhythm: Normal rate.   Pulmonary:      Effort: Pulmonary effort is normal. No respiratory distress.      Breath sounds: Normal breath sounds.   Skin:     Findings: Erythema, lesion and rash present.      Comments: Upon examination there is the presence of a linear erythematous vesicular rash that does follow L1 dermatome, does not cross midline   Neurological:      General: No focal deficit present.      Mental Status: She is alert and oriented to person, place, and time.   Psychiatric:         Mood and Affect: Mood normal.         Behavior: Behavior normal.         Procedures    Point of Care Test & Imaging Results from this visit  No results found for this visit on 02/02/25.   No results found.    Diagnostic study results (if any) were reviewed by Thien Morales PA-C.    Assessment/Plan   Allergies, medications, history, and pertinent labs/EKGs/Imaging reviewed by Thien Morales PA-C.     Medical Decision Making  I did discuss with patient I believe she has shingles, patient will be placed on valacyclovir may follow-up with primary care.  Patient verbalized understanding is agreeable to plan discharge emergent care A+O x 4 stable condition no signs of distress    Orders and Diagnoses  Diagnoses and all orders for this visit:  Herpes zoster with complication  -     valACYclovir (Valtrex) 1 gram tablet; Take 1 tablet (1,000 mg) by mouth 3 times a day for 7 days.      Medical Admin Record      Patient disposition: Home    Electronically signed by Thien Morales PA-C  12:02 PM

## 2025-02-03 LAB
AORTIC VALVE MEAN GRADIENT: 4 MMHG
AORTIC VALVE PEAK VELOCITY: 1.4 M/S
AV PEAK GRADIENT: 8 MMHG
AVA (PEAK VEL): 1.75 CM2
AVA (VTI): 2.05 CM2
EJECTION FRACTION APICAL 4 CHAMBER: 69.5
EJECTION FRACTION: 63 %
LEFT ATRIUM VOLUME AREA LENGTH INDEX BSA: 32.8 ML/M2
LEFT VENTRICLE INTERNAL DIMENSION DIASTOLE: 4.19 CM (ref 3.5–6)
LEFT VENTRICULAR OUTFLOW TRACT DIAMETER: 1.97 CM
MITRAL VALVE E/A RATIO: 1.11
RIGHT VENTRICLE FREE WALL PEAK S': 14 CM/S
RIGHT VENTRICLE PEAK SYSTOLIC PRESSURE: 30.4 MMHG
TRICUSPID ANNULAR PLANE SYSTOLIC EXCURSION: 2.3 CM

## 2025-03-22 DIAGNOSIS — I10 HYPERTENSION, UNSPECIFIED TYPE: ICD-10-CM

## 2025-03-25 RX ORDER — LISINOPRIL AND HYDROCHLOROTHIAZIDE 20; 25 MG/1; MG/1
1 TABLET ORAL DAILY
Qty: 90 TABLET | Refills: 2 | Status: SHIPPED | OUTPATIENT
Start: 2025-03-25

## 2025-04-21 LAB
ANION GAP SERPL CALCULATED.4IONS-SCNC: 10 MMOL/L (CALC) (ref 7–17)
BUN SERPL-MCNC: 36 MG/DL (ref 7–25)
BUN/CREAT SERPL: 27 (CALC) (ref 6–22)
CALCIUM SERPL-MCNC: 9.1 MG/DL (ref 8.6–10.4)
CHLORIDE SERPL-SCNC: 105 MMOL/L (ref 98–110)
CO2 SERPL-SCNC: 26 MMOL/L (ref 20–32)
CREAT SERPL-MCNC: 1.34 MG/DL (ref 0.6–1)
EGFRCR SERPLBLD CKD-EPI 2021: 42 ML/MIN/1.73M2
GLUCOSE SERPL-MCNC: 79 MG/DL (ref 65–99)
POTASSIUM SERPL-SCNC: 4.3 MMOL/L (ref 3.5–5.3)
SODIUM SERPL-SCNC: 141 MMOL/L (ref 135–146)

## 2025-04-22 LAB
ALBUMIN SERPL-MCNC: 4.3 G/DL (ref 3.6–5.1)
ALBUMIN/CREAT UR: 12 MG/G CREAT
ALP SERPL-CCNC: 76 U/L (ref 37–153)
ALT SERPL-CCNC: 13 U/L (ref 6–29)
ANION GAP SERPL CALCULATED.4IONS-SCNC: 7 MMOL/L (CALC) (ref 7–17)
AST SERPL-CCNC: 20 U/L (ref 10–35)
BASOPHILS # BLD AUTO: 59 CELLS/UL (ref 0–200)
BASOPHILS NFR BLD AUTO: 1 %
BILIRUB SERPL-MCNC: 0.4 MG/DL (ref 0.2–1.2)
BUN SERPL-MCNC: 36 MG/DL (ref 7–25)
CALCIUM SERPL-MCNC: 9 MG/DL (ref 8.6–10.4)
CHLORIDE SERPL-SCNC: 106 MMOL/L (ref 98–110)
CHOLEST SERPL-MCNC: 185 MG/DL
CHOLEST/HDLC SERPL: 2.5 (CALC)
CO2 SERPL-SCNC: 28 MMOL/L (ref 20–32)
CREAT SERPL-MCNC: 1.34 MG/DL (ref 0.6–1)
CREAT UR-MCNC: 69 MG/DL (ref 20–275)
EGFRCR SERPLBLD CKD-EPI 2021: 42 ML/MIN/1.73M2
EOSINOPHIL # BLD AUTO: 142 CELLS/UL (ref 15–500)
EOSINOPHIL NFR BLD AUTO: 2.4 %
ERYTHROCYTE [DISTWIDTH] IN BLOOD BY AUTOMATED COUNT: 12.9 % (ref 11–15)
GLUCOSE SERPL-MCNC: 84 MG/DL (ref 65–99)
HCT VFR BLD AUTO: 30.6 % (ref 35–45)
HDLC SERPL-MCNC: 75 MG/DL
HGB BLD-MCNC: 10 G/DL (ref 11.7–15.5)
LDLC SERPL CALC-MCNC: 90 MG/DL (CALC)
LYMPHOCYTES # BLD AUTO: 2484 CELLS/UL (ref 850–3900)
LYMPHOCYTES NFR BLD AUTO: 42.1 %
MCH RBC QN AUTO: 29.5 PG (ref 27–33)
MCHC RBC AUTO-ENTMCNC: 32.7 G/DL (ref 32–36)
MCV RBC AUTO: 90.3 FL (ref 80–100)
MICROALBUMIN UR-MCNC: 0.8 MG/DL
MONOCYTES # BLD AUTO: 502 CELLS/UL (ref 200–950)
MONOCYTES NFR BLD AUTO: 8.5 %
NEUTROPHILS # BLD AUTO: 2714 CELLS/UL (ref 1500–7800)
NEUTROPHILS NFR BLD AUTO: 46 %
NONHDLC SERPL-MCNC: 110 MG/DL (CALC)
PLATELET # BLD AUTO: 203 THOUSAND/UL (ref 140–400)
PMV BLD REES-ECKER: 10.5 FL (ref 7.5–12.5)
POTASSIUM SERPL-SCNC: 4.3 MMOL/L (ref 3.5–5.3)
PROT SERPL-MCNC: 6.5 G/DL (ref 6.1–8.1)
RBC # BLD AUTO: 3.39 MILLION/UL (ref 3.8–5.1)
SODIUM SERPL-SCNC: 141 MMOL/L (ref 135–146)
TRIGL SERPL-MCNC: 105 MG/DL
TSH SERPL-ACNC: 1.27 MIU/L (ref 0.4–4.5)
WBC # BLD AUTO: 5.9 THOUSAND/UL (ref 3.8–10.8)

## 2025-04-29 ENCOUNTER — APPOINTMENT (OUTPATIENT)
Dept: CARDIOLOGY | Facility: CLINIC | Age: 70
End: 2025-04-29
Payer: MEDICARE

## 2025-05-06 ENCOUNTER — APPOINTMENT (OUTPATIENT)
Dept: PRIMARY CARE | Facility: CLINIC | Age: 70
End: 2025-05-06
Payer: MEDICARE

## 2025-05-06 VITALS
TEMPERATURE: 97.3 F | BODY MASS INDEX: 23.67 KG/M2 | DIASTOLIC BLOOD PRESSURE: 68 MMHG | OXYGEN SATURATION: 98 % | SYSTOLIC BLOOD PRESSURE: 134 MMHG | HEART RATE: 69 BPM | WEIGHT: 133.6 LBS

## 2025-05-06 DIAGNOSIS — N18.32 CHRONIC RENAL IMPAIRMENT, STAGE 3B (MULTI): ICD-10-CM

## 2025-05-06 DIAGNOSIS — I10 HYPERTENSION, BENIGN: Primary | ICD-10-CM

## 2025-05-06 PROCEDURE — 3078F DIAST BP <80 MM HG: CPT | Performed by: FAMILY MEDICINE

## 2025-05-06 PROCEDURE — G2211 COMPLEX E/M VISIT ADD ON: HCPCS | Performed by: FAMILY MEDICINE

## 2025-05-06 PROCEDURE — 1126F AMNT PAIN NOTED NONE PRSNT: CPT | Performed by: FAMILY MEDICINE

## 2025-05-06 PROCEDURE — 3075F SYST BP GE 130 - 139MM HG: CPT | Performed by: FAMILY MEDICINE

## 2025-05-06 PROCEDURE — 1159F MED LIST DOCD IN RCRD: CPT | Performed by: FAMILY MEDICINE

## 2025-05-06 PROCEDURE — 1036F TOBACCO NON-USER: CPT | Performed by: FAMILY MEDICINE

## 2025-05-06 PROCEDURE — 99214 OFFICE O/P EST MOD 30 MIN: CPT | Performed by: FAMILY MEDICINE

## 2025-05-06 ASSESSMENT — PATIENT HEALTH QUESTIONNAIRE - PHQ9
SUM OF ALL RESPONSES TO PHQ9 QUESTIONS 1 AND 2: 0
1. LITTLE INTEREST OR PLEASURE IN DOING THINGS: NOT AT ALL
2. FEELING DOWN, DEPRESSED OR HOPELESS: NOT AT ALL

## 2025-05-06 ASSESSMENT — PAIN SCALES - GENERAL: PAINLEVEL_OUTOF10: 0-NO PAIN

## 2025-05-06 NOTE — PROGRESS NOTES
Subjective   Patient ID: Anusha Dinero is a 70 y.o. female who presents for 6 Month Follow-up.    HPI     6 month follow up    Protracted symptoms from Shingles, 02/25: slow to resolve discoloration,intermittent tingling/numbness.    Cardiology follow up with MRI CT angio    Review of Systems   All other systems reviewed and are negative.      Objective   /68 (BP Location: Left arm, Patient Position: Sitting, BP Cuff Size: Adult)   Pulse 69   Temp 36.3 °C (97.3 °F) (Temporal)   Wt 60.6 kg (133 lb 9.6 oz)   SpO2 98%   BMI 23.67 kg/m²     Physical Exam  Vitals and nursing note reviewed.   Cardiovascular:      Rate and Rhythm: Normal rate and regular rhythm.   Pulmonary:      Effort: Pulmonary effort is normal.      Breath sounds: Normal breath sounds.   Musculoskeletal:      Cervical back: Neck supple.   Lymphadenopathy:      Cervical: No cervical adenopathy.   Neurological:      Mental Status: She is alert.         Assessment/Plan   Diagnoses and all orders for this visit:  Hypertension, benign  Comments:  at goal on Hyzaar.  Chronic renal impairment, stage 3b (Multi)  Comments:  possible cause: medication ( Hyzaar), stable BUN/Cr for 3 + years.  Other orders  -     Follow Up In Primary Care - Established  -     Follow Up In Primary Care - Established; Future

## 2025-06-25 ENCOUNTER — APPOINTMENT (OUTPATIENT)
Dept: CARDIOLOGY | Facility: CLINIC | Age: 70
End: 2025-06-25
Payer: MEDICARE

## 2025-07-08 ENCOUNTER — HOSPITAL ENCOUNTER (OUTPATIENT)
Dept: RADIOLOGY | Facility: HOSPITAL | Age: 70
Discharge: HOME | End: 2025-07-08
Payer: MEDICARE

## 2025-07-08 DIAGNOSIS — I71.21 ANEURYSM OF ASCENDING AORTA WITHOUT RUPTURE: ICD-10-CM

## 2025-07-08 DIAGNOSIS — R93.1 AGATSTON CORONARY ARTERY CALCIUM SCORE LESS THAN 100: ICD-10-CM

## 2025-07-08 DIAGNOSIS — I10 PRIMARY HYPERTENSION: ICD-10-CM

## 2025-07-08 DIAGNOSIS — R01.1 MURMUR, HEART: ICD-10-CM

## 2025-07-08 DIAGNOSIS — R09.89 BILATERAL CAROTID BRUITS: ICD-10-CM

## 2025-07-08 PROCEDURE — 71555 MRI ANGIO CHEST W OR W/O DYE: CPT | Performed by: RADIOLOGY

## 2025-07-08 PROCEDURE — 2550000001 HC RX 255 CONTRASTS: Performed by: STUDENT IN AN ORGANIZED HEALTH CARE EDUCATION/TRAINING PROGRAM

## 2025-07-08 PROCEDURE — A9575 INJ GADOTERATE MEGLUMI 0.1ML: HCPCS | Performed by: STUDENT IN AN ORGANIZED HEALTH CARE EDUCATION/TRAINING PROGRAM

## 2025-07-08 PROCEDURE — 71555 MRI ANGIO CHEST W OR W/O DYE: CPT

## 2025-07-08 RX ORDER — GADOTERATE MEGLUMINE 376.9 MG/ML
12 INJECTION INTRAVENOUS
Status: COMPLETED | OUTPATIENT
Start: 2025-07-08 | End: 2025-07-08

## 2025-07-08 RX ADMIN — GADOTERATE MEGLUMINE 12 ML: 376.9 INJECTION INTRAVENOUS at 16:23

## 2025-07-12 PROBLEM — E78.5 HYPERLIPIDEMIA: Status: RESOLVED | Noted: 2024-12-23 | Resolved: 2025-07-12

## 2025-07-12 PROBLEM — M85.80 OSTEOPENIA: Status: ACTIVE | Noted: 2025-07-12

## 2025-07-12 PROBLEM — M81.0 OSTEOPOROSIS: Status: RESOLVED | Noted: 2024-12-23 | Resolved: 2025-07-12

## 2025-07-14 ENCOUNTER — OFFICE VISIT (OUTPATIENT)
Dept: CARDIOLOGY | Facility: CLINIC | Age: 70
End: 2025-07-14
Payer: MEDICARE

## 2025-07-14 VITALS
DIASTOLIC BLOOD PRESSURE: 58 MMHG | HEIGHT: 63 IN | WEIGHT: 135.9 LBS | BODY MASS INDEX: 24.08 KG/M2 | SYSTOLIC BLOOD PRESSURE: 136 MMHG | HEART RATE: 63 BPM | OXYGEN SATURATION: 99 %

## 2025-07-14 DIAGNOSIS — I71.21 ANEURYSM OF ASCENDING AORTA WITHOUT RUPTURE: ICD-10-CM

## 2025-07-14 DIAGNOSIS — Z98.890 HISTORY OF CARDIAC CATHETERIZATION: ICD-10-CM

## 2025-07-14 DIAGNOSIS — I77.1 SUBCLAVIAN ARTERIAL STENOSIS: ICD-10-CM

## 2025-07-14 DIAGNOSIS — I35.1 NONRHEUMATIC AORTIC (VALVE) INSUFFICIENCY: Primary | ICD-10-CM

## 2025-07-14 DIAGNOSIS — R09.89 BILATERAL CAROTID BRUITS: ICD-10-CM

## 2025-07-14 DIAGNOSIS — I10 HYPERTENSION, BENIGN: ICD-10-CM

## 2025-07-14 DIAGNOSIS — R93.1 AGATSTON CORONARY ARTERY CALCIUM SCORE LESS THAN 100: ICD-10-CM

## 2025-07-14 PROCEDURE — 99214 OFFICE O/P EST MOD 30 MIN: CPT | Performed by: STUDENT IN AN ORGANIZED HEALTH CARE EDUCATION/TRAINING PROGRAM

## 2025-07-14 PROCEDURE — 1036F TOBACCO NON-USER: CPT | Performed by: STUDENT IN AN ORGANIZED HEALTH CARE EDUCATION/TRAINING PROGRAM

## 2025-07-14 PROCEDURE — 3075F SYST BP GE 130 - 139MM HG: CPT | Performed by: STUDENT IN AN ORGANIZED HEALTH CARE EDUCATION/TRAINING PROGRAM

## 2025-07-14 PROCEDURE — 3078F DIAST BP <80 MM HG: CPT | Performed by: STUDENT IN AN ORGANIZED HEALTH CARE EDUCATION/TRAINING PROGRAM

## 2025-07-14 PROCEDURE — 99212 OFFICE O/P EST SF 10 MIN: CPT | Performed by: STUDENT IN AN ORGANIZED HEALTH CARE EDUCATION/TRAINING PROGRAM

## 2025-07-14 PROCEDURE — 1159F MED LIST DOCD IN RCRD: CPT | Performed by: STUDENT IN AN ORGANIZED HEALTH CARE EDUCATION/TRAINING PROGRAM

## 2025-07-14 PROCEDURE — 3008F BODY MASS INDEX DOCD: CPT | Performed by: STUDENT IN AN ORGANIZED HEALTH CARE EDUCATION/TRAINING PROGRAM

## 2025-07-14 NOTE — PROGRESS NOTES
"Chief Complaint:   Heart Murmur, Hypertension, bruit, aneurysm, and Hyperlipidemia (6 month follow up)     History Of Present Illness:    Anusha Dinero is a 70 y.o. female returns for follow-up appointment.  Patient underwent echocardiogram showed ejection fraction of 60% moderate AI and mildly dilated ascending thoracic aorta.  Carotid ultrasound shows mild carotid artery disease with bilateral greater than 50% stenosis in the subclavian arteries.  MRA of the chest revealed mildly dilated send ICU admission for centimeters the presence of renal and pancreatic cyst.  Patient has been asymptomatic.  She feels fine otherwise.     Last Recorded Vitals:  Vitals:    07/14/25 1042   BP: 136/58   BP Location: Left arm   Patient Position: Sitting   BP Cuff Size: Adult   Pulse: 63   SpO2: 99%   Weight: 61.6 kg (135 lb 14.4 oz)   Height: 1.6 m (5' 3\")       Review of Systems  ROS      Allergies:  Patient has no known allergies.    Outpatient Medications:  Current Outpatient Medications   Medication Instructions    biotin 5 mg tablet 1 tablet, Daily    calcium carbonate-vitamin D3 (Oscal-500) 500 mg-10 mcg (400 unit) tablet 1 tablet, Daily    colchicine 0.6 mg tablet Day 1:Take 1.2 mg at the onset, 0.6 mg an hour later. Day 2, take 0.6 mg bid until flare up resolves    cyanocobalamin, vitamin B-12, 1,000 mcg capsule 1 capsule, Daily    lisinopriL-hydrochlorothiazide 20-25 mg tablet 1 tablet, oral, Daily    vit A/vit C/vit E/zinc/copper (PRESERVISION AREDS ORAL) 1 tablet, Daily        Last Labs:  CBC -  Lab Results   Component Value Date    WBC 5.9 04/21/2025    HGB 10.0 (L) 04/21/2025    HCT 30.6 (L) 04/21/2025    MCV 90.3 04/21/2025     04/21/2025       CMP -  Lab Results   Component Value Date    CALCIUM 9.1 04/21/2025    PROT 6.5 04/21/2025    ALBUMIN 4.3 04/21/2025    AST 20 04/21/2025    ALT 13 04/21/2025    ALKPHOS 76 04/21/2025    BILITOT 0.4 04/21/2025       LIPID PANEL -   Lab Results   Component Value Date    " "CHOL 185 04/21/2025    TRIG 105 04/21/2025    HDL 75 04/21/2025    CHHDL 2.5 04/21/2025    LDLF 86 06/03/2019    VLDL 26 11/16/2023    NHDL 110 04/21/2025       RENAL FUNCTION PANEL -   Lab Results   Component Value Date    GLUCOSE 79 04/21/2025     04/21/2025    K 4.3 04/21/2025     04/21/2025    CO2 26 04/21/2025    ANIONGAP 10 04/21/2025    BUN 36 (H) 04/21/2025    CREATININE 1.34 (H) 04/21/2025    CALCIUM 9.1 04/21/2025    ALBUMIN 4.3 04/21/2025        No results found for: \"BNP\", \"HGBA1C\"    Last Cardiology Tests:  ECG:  No results found for this or any previous visit (from the past 4464 hours).     Echo:  No results found for this or any previous visit from the past 1095 days.       Ejection Fractions:  EF   Date/Time Value Ref Range Status   01/27/2025 11:00 AM 63 %        Cath:  CV NCDR CATHPCI V5 COLLECTION FORM     Assessment and Plan    1.  Aortic aneurysm: Measuring 4.2 cm by calcium score.  No previous comparisons in our system.  Presence of cardiac murmur noted on physical exam.  MRA of the chest shows the ascending vascular measured 4 cm.  Due to repeat surveillance in 24 to 36 months.     2.  Calcium score of 43.  Aortic atherosclerosis.  Diffuse.  Baseline ECG of normal sinus rhythm.  Discussed the role of statins in terms of minimizing risk of heart attack and strokes.  Patient would like to continue to think about the addition of statin therapy.       3.  Mild bilateral carotid restenosis of less than 50%.  Bilateral subclavian stenosis graded 50%.  Start aspirin 1 mg daily.  Offered to initiate statin therapy which the patient wants to think about.  Also offered referral to endovascular.  Patient declined for now as she is fairly asymptomatic.     4.  Hypertension: Blood pressures are reasonable controlled today with current medical therapy.    5.  Moderate AI: Appreciate most recent echocardiogram.  LV function is normal.  Patient karmen asymptomatic.  Repeat within 12 " months.    Follow-up in 6 months     (This note was generated with voice recognition software and may contain errors including spelling, grammar, syntax and missed recognition of what was dictated, of which may not have been fully corrected)     Ignacio Berg MD PhD

## 2025-11-06 ENCOUNTER — APPOINTMENT (OUTPATIENT)
Dept: PRIMARY CARE | Facility: CLINIC | Age: 70
End: 2025-11-06
Payer: MEDICARE

## 2026-01-19 ENCOUNTER — APPOINTMENT (OUTPATIENT)
Dept: CARDIOLOGY | Facility: CLINIC | Age: 71
End: 2026-01-19
Payer: MEDICARE